# Patient Record
Sex: FEMALE | Race: BLACK OR AFRICAN AMERICAN
[De-identification: names, ages, dates, MRNs, and addresses within clinical notes are randomized per-mention and may not be internally consistent; named-entity substitution may affect disease eponyms.]

---

## 2021-06-23 ENCOUNTER — HOSPITAL ENCOUNTER (EMERGENCY)
Dept: HOSPITAL 8 - ED | Age: 26
Discharge: HOME | End: 2021-06-23
Payer: MEDICAID

## 2021-06-23 VITALS — HEIGHT: 63 IN | WEIGHT: 165.35 LBS | BODY MASS INDEX: 29.3 KG/M2

## 2021-06-23 VITALS — DIASTOLIC BLOOD PRESSURE: 90 MMHG | SYSTOLIC BLOOD PRESSURE: 132 MMHG

## 2021-06-23 DIAGNOSIS — N89.8: ICD-10-CM

## 2021-06-23 DIAGNOSIS — A64: ICD-10-CM

## 2021-06-23 DIAGNOSIS — A60.04: Primary | ICD-10-CM

## 2021-06-23 LAB
CLUE CELLS: (no result)
MICROSCOPIC: (no result)
T VAGINALIS RRNA GENITAL QL PROBE: (no result)
WET PREP WBCS: (no result)

## 2021-06-23 PROCEDURE — 81001 URINALYSIS AUTO W/SCOPE: CPT

## 2021-06-23 PROCEDURE — 87491 CHLMYD TRACH DNA AMP PROBE: CPT

## 2021-06-23 PROCEDURE — 87210 SMEAR WET MOUNT SALINE/INK: CPT

## 2021-06-23 PROCEDURE — 87086 URINE CULTURE/COLONY COUNT: CPT

## 2021-06-23 PROCEDURE — 99283 EMERGENCY DEPT VISIT LOW MDM: CPT

## 2021-06-23 PROCEDURE — 87808 TRICHOMONAS ASSAY W/OPTIC: CPT

## 2021-06-23 PROCEDURE — 96372 THER/PROPH/DIAG INJ SC/IM: CPT

## 2021-06-23 PROCEDURE — 99284 EMERGENCY DEPT VISIT MOD MDM: CPT

## 2021-06-23 PROCEDURE — 87591 N.GONORRHOEAE DNA AMP PROB: CPT

## 2021-06-23 NOTE — NUR
INITIAL PT CONTACT. PT PRESENTS TO ED C/O "I THINK I HAVE AN STD, I HAVE 
SWELLING AND DISCOMFORT IN MY PELVIS AREA. I HAVE A BURNING SENSATION SOMETIMES 
AND WEIRD PAIN FEELING BACK BY MY ANUS." PT REPORTS RECENT UNPROTECTED SEX WITH 
A NEW PARTNER. PT DENIES ANY OTHER COMPLAINTS AT THIS TIME. CALL LIGHT AND 
BELONGINGS WITHIN REACH. AWAITING ERP

## 2021-09-08 ENCOUNTER — HOSPITAL ENCOUNTER (EMERGENCY)
Dept: HOSPITAL 8 - ED | Age: 26
Discharge: HOME | End: 2021-09-08
Payer: MEDICAID

## 2021-09-08 VITALS — WEIGHT: 170.42 LBS | HEIGHT: 63 IN | BODY MASS INDEX: 30.2 KG/M2

## 2021-09-08 VITALS — DIASTOLIC BLOOD PRESSURE: 79 MMHG | SYSTOLIC BLOOD PRESSURE: 125 MMHG

## 2021-09-08 DIAGNOSIS — M54.9: ICD-10-CM

## 2021-09-08 DIAGNOSIS — N93.9: Primary | ICD-10-CM

## 2021-09-08 DIAGNOSIS — Z23: ICD-10-CM

## 2021-09-08 LAB
ALBUMIN SERPL-MCNC: 3.6 G/DL (ref 3.4–5)
ANION GAP SERPL CALC-SCNC: 6 MMOL/L (ref 5–15)
BASOPHILS # BLD AUTO: 0.1 X10^3/UL (ref 0–0.1)
BASOPHILS NFR BLD AUTO: 1 % (ref 0–1)
CALCIUM SERPL-MCNC: 8.8 MG/DL (ref 8.5–10.1)
CHLORIDE SERPL-SCNC: 106 MMOL/L (ref 98–107)
CREAT SERPL-MCNC: 0.68 MG/DL (ref 0.55–1.02)
EOSINOPHIL # BLD AUTO: 0.5 X10^3/UL (ref 0–0.4)
EOSINOPHIL NFR BLD AUTO: 4 % (ref 1–7)
ERYTHROCYTE [DISTWIDTH] IN BLOOD BY AUTOMATED COUNT: 16.8 % (ref 9.6–15.2)
LYMPHOCYTES # BLD AUTO: 3.5 X10^3/UL (ref 1–3.4)
LYMPHOCYTES NFR BLD AUTO: 30 % (ref 22–44)
MCH RBC QN AUTO: 25.4 PG (ref 27–34.8)
MCHC RBC AUTO-ENTMCNC: 33.8 G/DL (ref 32.4–35.8)
MICROSCOPIC: (no result)
MONOCYTES # BLD AUTO: 0.9 X10^3/UL (ref 0.2–0.8)
MONOCYTES NFR BLD AUTO: 8 % (ref 2–9)
NEUTROPHILS # BLD AUTO: 6.8 X10^3/UL (ref 1.8–6.8)
NEUTROPHILS NFR BLD AUTO: 58 % (ref 42–75)
PLATELET # BLD AUTO: 290 X10^3/UL (ref 130–400)
PMV BLD AUTO: 7.9 FL (ref 7.4–10.4)
RBC # BLD AUTO: 5.4 X10^6/UL (ref 3.82–5.3)

## 2021-09-08 PROCEDURE — 99284 EMERGENCY DEPT VISIT MOD MDM: CPT

## 2021-09-08 PROCEDURE — 87086 URINE CULTURE/COLONY COUNT: CPT

## 2021-09-08 PROCEDURE — 86901 BLOOD TYPING SEROLOGIC RH(D): CPT

## 2021-09-08 PROCEDURE — 36415 COLL VENOUS BLD VENIPUNCTURE: CPT

## 2021-09-08 PROCEDURE — 96361 HYDRATE IV INFUSION ADD-ON: CPT

## 2021-09-08 PROCEDURE — 76830 TRANSVAGINAL US NON-OB: CPT

## 2021-09-08 PROCEDURE — 81001 URINALYSIS AUTO W/SCOPE: CPT

## 2021-09-08 PROCEDURE — 84702 CHORIONIC GONADOTROPIN TEST: CPT

## 2021-09-08 PROCEDURE — 96374 THER/PROPH/DIAG INJ IV PUSH: CPT

## 2021-09-08 PROCEDURE — 82040 ASSAY OF SERUM ALBUMIN: CPT

## 2021-09-08 PROCEDURE — 80048 BASIC METABOLIC PNL TOTAL CA: CPT

## 2021-09-08 PROCEDURE — 91301: CPT

## 2021-09-08 PROCEDURE — 0011A: CPT

## 2021-09-08 PROCEDURE — 85025 COMPLETE CBC W/AUTO DIFF WBC: CPT

## 2021-09-08 NOTE — NUR
PT STATES SHE HAD A "SURGICAL " IN [place] THIS WEEKEND. PT STATES SHE 
HAS HAD PAIN AND VAGINAL BLEEDING SINCE. (HAS SATURATED 5 LIGHTWEIGHT PADS) 
WITH INTERMITTENT MILD TO MODERATE CRAMPING

GOOD COLOR/VSS

## 2021-09-08 NOTE — NUR
VERONICA RN: APPLIED MONITORS ON PT, IV STARTED, LABS DRAWN, WARM BLANKETS AND 
CALL LIGHT IN PLACE, PT INSTRUCTED ON POC AND DR TUCKER TO BS. SIDE RAILS UP.

## 2021-09-08 NOTE — NUR
us at bedside

1l ns started

cc ua sent for analysis

patient agreeable to vaccine-erp to place moderna order